# Patient Record
Sex: MALE | Race: OTHER | HISPANIC OR LATINO | URBAN - METROPOLITAN AREA
[De-identification: names, ages, dates, MRNs, and addresses within clinical notes are randomized per-mention and may not be internally consistent; named-entity substitution may affect disease eponyms.]

---

## 2019-02-09 ENCOUNTER — INPATIENT (INPATIENT)
Facility: HOSPITAL | Age: 39
LOS: 2 days | Discharge: HOME | DRG: 854 | End: 2019-02-12
Attending: INTERNAL MEDICINE | Admitting: INTERNAL MEDICINE
Payer: COMMERCIAL

## 2019-02-09 VITALS
SYSTOLIC BLOOD PRESSURE: 145 MMHG | TEMPERATURE: 101 F | WEIGHT: 214.95 LBS | RESPIRATION RATE: 20 BRPM | HEIGHT: 72 IN | OXYGEN SATURATION: 99 % | HEART RATE: 123 BPM | DIASTOLIC BLOOD PRESSURE: 91 MMHG

## 2019-02-09 DIAGNOSIS — J36 PERITONSILLAR ABSCESS: ICD-10-CM

## 2019-02-09 LAB
ABO RH CONFIRMATION: SIGNIFICANT CHANGE UP
ALBUMIN SERPL ELPH-MCNC: 3.8 G/DL — SIGNIFICANT CHANGE UP (ref 3.3–5.2)
ALP SERPL-CCNC: 98 U/L — SIGNIFICANT CHANGE UP (ref 40–120)
ALT FLD-CCNC: 37 U/L — SIGNIFICANT CHANGE UP
ANION GAP SERPL CALC-SCNC: 17 MMOL/L — SIGNIFICANT CHANGE UP (ref 5–17)
ANISOCYTOSIS BLD QL: SLIGHT — SIGNIFICANT CHANGE UP
APPEARANCE UR: CLEAR — SIGNIFICANT CHANGE UP
AST SERPL-CCNC: 15 U/L — SIGNIFICANT CHANGE UP
BACTERIA # UR AUTO: NEGATIVE — SIGNIFICANT CHANGE UP
BILIRUB SERPL-MCNC: 1.1 MG/DL — SIGNIFICANT CHANGE UP (ref 0.4–2)
BILIRUB UR-MCNC: ABNORMAL
BLD GP AB SCN SERPL QL: SIGNIFICANT CHANGE UP
BUN SERPL-MCNC: 15 MG/DL — SIGNIFICANT CHANGE UP (ref 8–20)
CALCIUM SERPL-MCNC: 9.5 MG/DL — SIGNIFICANT CHANGE UP (ref 8.6–10.2)
CHLORIDE SERPL-SCNC: 98 MMOL/L — SIGNIFICANT CHANGE UP (ref 98–107)
CO2 SERPL-SCNC: 23 MMOL/L — SIGNIFICANT CHANGE UP (ref 22–29)
COLOR SPEC: YELLOW — SIGNIFICANT CHANGE UP
CREAT SERPL-MCNC: 0.89 MG/DL — SIGNIFICANT CHANGE UP (ref 0.5–1.3)
CRP SERPL-MCNC: 22.6 MG/DL — HIGH (ref 0–0.4)
DIFF PNL FLD: ABNORMAL
EPI CELLS # UR: NEGATIVE — SIGNIFICANT CHANGE UP
GLUCOSE BLDC GLUCOMTR-MCNC: 173 MG/DL — HIGH (ref 70–99)
GLUCOSE SERPL-MCNC: 116 MG/DL — HIGH (ref 70–115)
GLUCOSE UR QL: NEGATIVE MG/DL — SIGNIFICANT CHANGE UP
HCT VFR BLD CALC: 42.5 % — SIGNIFICANT CHANGE UP (ref 42–52)
HGB BLD-MCNC: 14.7 G/DL — SIGNIFICANT CHANGE UP (ref 14–18)
HYPOCHROMIA BLD QL: SLIGHT — SIGNIFICANT CHANGE UP
KETONES UR-MCNC: ABNORMAL
LACTATE BLDV-MCNC: 0.8 MMOL/L — SIGNIFICANT CHANGE UP (ref 0.5–2)
LEUKOCYTE ESTERASE UR-ACNC: ABNORMAL
LYMPHOCYTES # BLD AUTO: 8 % — LOW (ref 20–55)
MACROCYTES BLD QL: SLIGHT — SIGNIFICANT CHANGE UP
MCHC RBC-ENTMCNC: 28.3 PG — SIGNIFICANT CHANGE UP (ref 27–31)
MCHC RBC-ENTMCNC: 34.6 G/DL — SIGNIFICANT CHANGE UP (ref 32–36)
MCV RBC AUTO: 81.7 FL — SIGNIFICANT CHANGE UP (ref 80–94)
MICROCYTES BLD QL: SLIGHT — SIGNIFICANT CHANGE UP
MONOCYTES NFR BLD AUTO: 4 % — SIGNIFICANT CHANGE UP (ref 3–10)
NEUTROPHILS NFR BLD AUTO: 88 % — HIGH (ref 37–73)
NITRITE UR-MCNC: POSITIVE
PH UR: 6 — SIGNIFICANT CHANGE UP (ref 5–8)
PLAT MORPH BLD: NORMAL — SIGNIFICANT CHANGE UP
PLATELET # BLD AUTO: 247 K/UL — SIGNIFICANT CHANGE UP (ref 150–400)
POIKILOCYTOSIS BLD QL AUTO: SLIGHT — SIGNIFICANT CHANGE UP
POTASSIUM SERPL-MCNC: 4.3 MMOL/L — SIGNIFICANT CHANGE UP (ref 3.5–5.3)
POTASSIUM SERPL-SCNC: 4.3 MMOL/L — SIGNIFICANT CHANGE UP (ref 3.5–5.3)
PROT SERPL-MCNC: 8.4 G/DL — SIGNIFICANT CHANGE UP (ref 6.6–8.7)
PROT UR-MCNC: 100 MG/DL
RBC # BLD: 5.2 M/UL — SIGNIFICANT CHANGE UP (ref 4.6–6.2)
RBC # FLD: 12.6 % — SIGNIFICANT CHANGE UP (ref 11–15.6)
RBC BLD AUTO: ABNORMAL
RBC CASTS # UR COMP ASSIST: SIGNIFICANT CHANGE UP /HPF (ref 0–4)
SODIUM SERPL-SCNC: 138 MMOL/L — SIGNIFICANT CHANGE UP (ref 135–145)
SP GR SPEC: 1.02 — SIGNIFICANT CHANGE UP (ref 1.01–1.02)
TYPE + AB SCN PNL BLD: SIGNIFICANT CHANGE UP
UROBILINOGEN FLD QL: NEGATIVE MG/DL — SIGNIFICANT CHANGE UP
WBC # BLD: 22.8 K/UL — HIGH (ref 4.8–10.8)
WBC # FLD AUTO: 22.8 K/UL — HIGH (ref 4.8–10.8)
WBC UR QL: SIGNIFICANT CHANGE UP

## 2019-02-09 PROCEDURE — 99284 EMERGENCY DEPT VISIT MOD MDM: CPT

## 2019-02-09 PROCEDURE — 99223 1ST HOSP IP/OBS HIGH 75: CPT

## 2019-02-09 PROCEDURE — 70491 CT SOFT TISSUE NECK W/DYE: CPT | Mod: 26

## 2019-02-09 RX ORDER — SODIUM CHLORIDE 9 MG/ML
1000 INJECTION INTRAMUSCULAR; INTRAVENOUS; SUBCUTANEOUS
Qty: 0 | Refills: 0 | Status: DISCONTINUED | OUTPATIENT
Start: 2019-02-09 | End: 2019-02-09

## 2019-02-09 RX ORDER — SODIUM CHLORIDE 9 MG/ML
1000 INJECTION INTRAMUSCULAR; INTRAVENOUS; SUBCUTANEOUS
Qty: 0 | Refills: 0 | Status: DISCONTINUED | OUTPATIENT
Start: 2019-02-09 | End: 2019-02-10

## 2019-02-09 RX ORDER — ONDANSETRON 8 MG/1
4 TABLET, FILM COATED ORAL EVERY 6 HOURS
Qty: 0 | Refills: 0 | Status: DISCONTINUED | OUTPATIENT
Start: 2019-02-09 | End: 2019-02-12

## 2019-02-09 RX ORDER — DEXAMETHASONE 0.5 MG/5ML
14 ELIXIR ORAL ONCE
Qty: 0 | Refills: 0 | Status: COMPLETED | OUTPATIENT
Start: 2019-02-10 | End: 2019-02-10

## 2019-02-09 RX ORDER — KETOROLAC TROMETHAMINE 30 MG/ML
30 SYRINGE (ML) INJECTION ONCE
Qty: 0 | Refills: 0 | Status: DISCONTINUED | OUTPATIENT
Start: 2019-02-09 | End: 2019-02-09

## 2019-02-09 RX ORDER — KETOROLAC TROMETHAMINE 30 MG/ML
15 SYRINGE (ML) INJECTION EVERY 6 HOURS
Qty: 0 | Refills: 0 | Status: DISCONTINUED | OUTPATIENT
Start: 2019-02-09 | End: 2019-02-12

## 2019-02-09 RX ORDER — DEXAMETHASONE 0.5 MG/5ML
10 ELIXIR ORAL ONCE
Qty: 0 | Refills: 0 | Status: COMPLETED | OUTPATIENT
Start: 2019-02-09 | End: 2019-02-09

## 2019-02-09 RX ORDER — ONDANSETRON 8 MG/1
4 TABLET, FILM COATED ORAL ONCE
Qty: 0 | Refills: 0 | Status: COMPLETED | OUTPATIENT
Start: 2019-02-09 | End: 2019-02-09

## 2019-02-09 RX ORDER — PANTOPRAZOLE SODIUM 20 MG/1
40 TABLET, DELAYED RELEASE ORAL
Qty: 0 | Refills: 0 | Status: DISCONTINUED | OUTPATIENT
Start: 2019-02-09 | End: 2019-02-12

## 2019-02-09 RX ORDER — ACETAMINOPHEN 500 MG
650 TABLET ORAL EVERY 6 HOURS
Qty: 0 | Refills: 0 | Status: DISCONTINUED | OUTPATIENT
Start: 2019-02-09 | End: 2019-02-12

## 2019-02-09 RX ORDER — SODIUM CHLORIDE 9 MG/ML
2000 INJECTION INTRAMUSCULAR; INTRAVENOUS; SUBCUTANEOUS ONCE
Qty: 0 | Refills: 0 | Status: COMPLETED | OUTPATIENT
Start: 2019-02-09 | End: 2019-02-09

## 2019-02-09 RX ORDER — DEXAMETHASONE 0.5 MG/5ML
10 ELIXIR ORAL ONCE
Qty: 0 | Refills: 0 | Status: COMPLETED | OUTPATIENT
Start: 2019-02-10 | End: 2019-02-10

## 2019-02-09 RX ORDER — IPRATROPIUM/ALBUTEROL SULFATE 18-103MCG
3 AEROSOL WITH ADAPTER (GRAM) INHALATION EVERY 6 HOURS
Qty: 0 | Refills: 0 | Status: DISCONTINUED | OUTPATIENT
Start: 2019-02-09 | End: 2019-02-12

## 2019-02-09 RX ORDER — FAMOTIDINE 10 MG/ML
20 INJECTION INTRAVENOUS ONCE
Qty: 0 | Refills: 0 | Status: COMPLETED | OUTPATIENT
Start: 2019-02-09 | End: 2019-02-09

## 2019-02-09 RX ORDER — DEXAMETHASONE 0.5 MG/5ML
20 ELIXIR ORAL ONCE
Qty: 0 | Refills: 0 | Status: COMPLETED | OUTPATIENT
Start: 2019-02-09 | End: 2019-02-09

## 2019-02-09 RX ADMIN — Medication 10 MILLIGRAM(S): at 12:56

## 2019-02-09 RX ADMIN — SODIUM CHLORIDE 125 MILLILITER(S): 9 INJECTION INTRAMUSCULAR; INTRAVENOUS; SUBCUTANEOUS at 18:41

## 2019-02-09 RX ADMIN — Medication 102 MILLIGRAM(S): at 12:29

## 2019-02-09 RX ADMIN — FAMOTIDINE 20 MILLIGRAM(S): 10 INJECTION INTRAVENOUS at 12:29

## 2019-02-09 RX ADMIN — SODIUM CHLORIDE 2000 MILLILITER(S): 9 INJECTION INTRAMUSCULAR; INTRAVENOUS; SUBCUTANEOUS at 12:28

## 2019-02-09 RX ADMIN — Medication 15 MILLIGRAM(S): at 23:13

## 2019-02-09 RX ADMIN — Medication 30 MILLIGRAM(S): at 12:30

## 2019-02-09 RX ADMIN — Medication 30 MILLIGRAM(S): at 12:56

## 2019-02-09 RX ADMIN — SODIUM CHLORIDE 2000 MILLILITER(S): 9 INJECTION INTRAMUSCULAR; INTRAVENOUS; SUBCUTANEOUS at 13:28

## 2019-02-09 RX ADMIN — Medication 110 MILLIGRAM(S): at 23:11

## 2019-02-09 RX ADMIN — Medication 100 MILLIGRAM(S): at 23:12

## 2019-02-09 RX ADMIN — Medication 600 MILLIGRAM(S): at 12:56

## 2019-02-09 RX ADMIN — ONDANSETRON 4 MILLIGRAM(S): 8 TABLET, FILM COATED ORAL at 12:30

## 2019-02-09 RX ADMIN — Medication 100 MILLIGRAM(S): at 12:30

## 2019-02-09 NOTE — ED ADULT NURSE NOTE - OBJECTIVE STATEMENT
assumed pt care at 1300.  Cardiac monitor showing sinus tachycardia and normal blood pressure and oxygen saturation on room air.  Pt taking cleocin X 5 days for positive strep infection.  Throat swelling, pain and fever not resolving.  Sent from urgent care.  Pt is alert and oriented X 3. Able to swallow water and saliva but not food.  IV fluids infusing well through 2 patent IV catheters in each arm. Last PO intake 0830 this morning water only. Awaiting ENT consult.   Significant other at bedside.

## 2019-02-09 NOTE — H&P ADULT - NSHPPHYSICALEXAM_GEN_ALL_CORE
ICU Vital Signs Last 24 Hrs  T(C): 37 (09 Feb 2019 13:01), Max: 39 (09 Feb 2019 12:06)  T(F): 98.6 (09 Feb 2019 13:01), Max: 102.2 (09 Feb 2019 12:06)  HR: 89 (09 Feb 2019 15:13) (89 - 123)  BP: 151/97 (09 Feb 2019 13:01) (145/91 - 151/97)  BP(mean): --  ABP: --  ABP(mean): --  RR: 18 (09 Feb 2019 15:13) (18 - 20)  SpO2: 97% (09 Feb 2019 15:13) (97% - 99%)

## 2019-02-09 NOTE — PROGRESS NOTE ADULT - SUBJECTIVE AND OBJECTIVE BOX
consult dictated  Left peritonsil cellitis  1 cc pus, cavity opened with dishwater type fluid  Cleocin IV  decadron taper  Advance diet  JBonafede

## 2019-02-09 NOTE — H&P ADULT - HISTORY OF PRESENT ILLNESS
This is 39yo M with PMh of GERD presenting with throat swelling and pain since Monday, he developed sore throat, went to Urgent Care and was told he has Strep, was prescribed cleocin which he has been taking.  Sx did not improve and went back to Urgent Care today, found to have an abscess and referred to ED.  Temp 102 in ED.  Has had trouble swallowing over last few days. Report severe pain during swallowing, unable to tolerate PO. Pt was seen by ENT, s/p 1 cc pus removed    Denied chest pain, SOB, palpitation, abd. pain, nausea, vomiting, headache, dizziness, numbness, tingling, urinary and bowel complaints.

## 2019-02-09 NOTE — ED PROVIDER NOTE - OBJECTIVE STATEMENT
39 yo male  pmh asthma , gerd  comes to ed with 5 day history ot b/ throat pain; pt went to urgent care and started on antibiotics without relief of symptoms; pt noted decreased food and liquid intake

## 2019-02-09 NOTE — CONSULT NOTE ADULT - ASSESSMENT
Peritonsillar Abscess  - started with strep throat  - PCN allergy - parents told him he had severe reaction to PCN as child and they had to take him to ED.  - IV Clinda  - IV decadron taper per ENT  - s/p I&D by ENT  - Pain control  - f/u cultures

## 2019-02-09 NOTE — ED ADULT NURSE NOTE - NSIMPLEMENTINTERV_GEN_ALL_ED
Implemented All Universal Safety Interventions:  [place] to call system. Call bell, personal items and telephone within reach. Instruct patient to call for assistance. Room bathroom lighting operational. Non-slip footwear when patient is off stretcher. Physically safe environment: no spills, clutter or unnecessary equipment. Stretcher in lowest position, wheels locked, appropriate side rails in place.

## 2019-02-09 NOTE — ED ADULT NURSE REASSESSMENT NOTE - NS ED NURSE REASSESS COMMENT FT1
As per Dr. Ruby, pt given 3 bottles of water.  Tolerating well.
Dr. Ruby (ENT) at bedside to evaluate.
Pt given jello, applesauce and yogurt with gingerale.  Will advance as tolerated as per MD order
Received pt from Jerica DAMON. PT resting comfortably in stretcher at this time. NAD noted at this time. Pt awaiting bed to floor
Report given to AMALIA Jimenez
pt eating, tolerating PO, denies diff breathing, no drooling noted, 98% RA, resp even unlabored, pt states he feels " so much better"
Report received from offgoing RN, charting as noted. Patient is A&Ox4, denies any pain or discomfort. Pt resting comfortably in bed with significant other at bedside, just finished meal.  Airway is patent, denies chest pain/respiratory distress. Updated pt on plan of care, awaiting bed.

## 2019-02-09 NOTE — CONSULT NOTE ADULT - SUBJECTIVE AND OBJECTIVE BOX
PCP: Dr. John - but looking for a new PCP    Information Obtained from:  ED provider, EHR, Patient     CC : Patient is a 38y old  Male who presents with a chief complaint of throat pain and swelling.    HPI:  39yo M presenting with throat swelling and pain.  5 days ago, he developed sore throat, went to Urgent Care and was told he has Strep, was prescribed cleocin which he has been taking.  Sx did not improve and went back to Urgent Care today, found to have an abscess and referred to ED.  Temp 102 in ED.  Has had trouble swallowing over last few days, but was able to tolerate solid food before my evaluation today.  No   sweats, dizziness, HA, changes in vision, cp, palpitations, sob, persistent cough, n/v/d, abd pain, dysuria, focal weakness, or calf pain.      REVIEW OF SYSTEMS:  See HPI, all others negative    PAST MEDICAL & SURGICAL HISTORY:  GERD (gastroesophageal reflux disease)  Asthma  No significant past surgical history    SOCIAL HISTORY:  Smoking Hx:  quit regular smoking 3ya, now smokes a cigarette every 2 months or so  ETOH Hx: 2-3 drinks/3-4 days a week  Illicit Drug Use: smokes marijuana daily    Allergies    penicillin (Unknown)    Intolerances    Home Medications:  omeprazole 40 mg oral delayed release capsule: 1 cap(s) orally once a day (2019 13:10)    FAMILY HISTORY: No relevant family hx      PHYSICAL EXAM:  Vital Signs Last 24 Hrs  T(C): 37 (2019 13:01), Max: 39 (2019 12:06)  T(F): 98.6 (2019 13:01), Max: 102.2 (2019 12:06)  HR: 89 (2019 15:13) (89 - 123)  BP: 151/97 (2019 13:01) (145/91 - 151/97)  BP(mean): --  RR: 18 (2019 15:13) (18 - 20)  SpO2: 97% (2019 15:13) (97% - 99%)    Virtual visit precludes detailed physical exam.  GENERAL: NAD, well-groomed, well-developed, awake, alert, oriented x 3, fluent and coherent speech, able to speak in full sentences     LABS:                        14.7   22.8  )-----------( 247      ( 2019 12:27 )             42.5         138  |  98  |  15.0  ----------------------------<  116<H>  4.3   |  23.0  |  0.89    Ca    9.5      2019 12:27    TPro  8.4  /  Alb  3.8  /  TBili  1.1  /  DBili  x   /  AST  15  /  ALT  37  /  AlkPhos  98        Urinalysis Basic - ( 2019 13:25 )    Color: Yellow / Appearance: Clear / S.025 / pH: x  Gluc: x / Ketone: Large  / Bili: Small / Urobili: Negative mg/dL   Blood: x / Protein: 100 mg/dL / Nitrite: Positive   Leuk Esterase: Trace / RBC: 0-2 /HPF / WBC 3-5   Sq Epi: x / Non Sq Epi: Negative / Bacteria: Negative    < from: CT Neck Soft Tissue w/ IV Cont (19 @ 14:34) >  Impression: Large left tonsillar/peritonsillar abscess with associated   left pharyngitis and left cervical lymphadenopathy.    < end of copied text >

## 2019-02-09 NOTE — ED STATDOCS - PROGRESS NOTE DETAILS
37 y/o M pt with no PMHx significant presents to the ED c/o worsening sore throat onset Monday (6 days ago). Reports difficulty speaking, fever, decreased PO intake, and difficulty swallowing. Pt states he went to the Urgent Care on Tuesday where they diagnosed him with strep and placed him on Clindamycin. He was informed to follow up with and ENT and if his symptoms worsened to come to the ED. He is allergic to Penicillin. No further complaints at this time. Patient will be moved to the main ED for further evaluation by another provider.   PE: CHICHO tonsillar swelling, L worse than the R, shifting his uvula 39 y/o M pt with no PMHx significant presents to the ED c/o worsening sore throat onset Monday (6 days ago). Reports difficulty speaking, fever, decreased PO intake, and difficulty swallowing. Pt states he went to the Urgent Care on Tuesday where they diagnosed him with strep and placed him on Clindamycin. He was informed to follow up with and ENT and if his symptoms worsened to come to the ED. He is allergic to Penicillin. No further complaints at this time. Patient will be moved to the main ED for further evaluation by another provider.   PE: CHICHO tonsillar swelling, L worse than the R, L touching his uvula, shifting his uvula to the R, with poor mouth opening, and voice is muffled Spoke with Dr. Pastrana and transferred care over to him.

## 2019-02-10 LAB
ANION GAP SERPL CALC-SCNC: 11 MMOL/L — SIGNIFICANT CHANGE UP (ref 5–17)
BUN SERPL-MCNC: 16 MG/DL — SIGNIFICANT CHANGE UP (ref 8–20)
CALCIUM SERPL-MCNC: 8.5 MG/DL — LOW (ref 8.6–10.2)
CHLORIDE SERPL-SCNC: 108 MMOL/L — HIGH (ref 98–107)
CO2 SERPL-SCNC: 21 MMOL/L — LOW (ref 22–29)
CREAT SERPL-MCNC: 0.74 MG/DL — SIGNIFICANT CHANGE UP (ref 0.5–1.3)
CULTURE RESULTS: NO GROWTH — SIGNIFICANT CHANGE UP
GLUCOSE SERPL-MCNC: 216 MG/DL — HIGH (ref 70–115)
HCT VFR BLD CALC: 36.6 % — LOW (ref 42–52)
HGB BLD-MCNC: 12.4 G/DL — LOW (ref 14–18)
MCHC RBC-ENTMCNC: 27.9 PG — SIGNIFICANT CHANGE UP (ref 27–31)
MCHC RBC-ENTMCNC: 33.9 G/DL — SIGNIFICANT CHANGE UP (ref 32–36)
MCV RBC AUTO: 82.2 FL — SIGNIFICANT CHANGE UP (ref 80–94)
PLATELET # BLD AUTO: 222 K/UL — SIGNIFICANT CHANGE UP (ref 150–400)
POTASSIUM SERPL-MCNC: 4.6 MMOL/L — SIGNIFICANT CHANGE UP (ref 3.5–5.3)
POTASSIUM SERPL-SCNC: 4.6 MMOL/L — SIGNIFICANT CHANGE UP (ref 3.5–5.3)
RBC # BLD: 4.45 M/UL — LOW (ref 4.6–6.2)
RBC # FLD: 12.7 % — SIGNIFICANT CHANGE UP (ref 11–15.6)
SODIUM SERPL-SCNC: 140 MMOL/L — SIGNIFICANT CHANGE UP (ref 135–145)
SPECIMEN SOURCE: SIGNIFICANT CHANGE UP
WBC # BLD: 17.7 K/UL — HIGH (ref 4.8–10.8)
WBC # FLD AUTO: 17.7 K/UL — HIGH (ref 4.8–10.8)

## 2019-02-10 PROCEDURE — 99233 SBSQ HOSP IP/OBS HIGH 50: CPT

## 2019-02-10 RX ORDER — SACCHAROMYCES BOULARDII 250 MG
250 POWDER IN PACKET (EA) ORAL
Qty: 0 | Refills: 0 | Status: DISCONTINUED | OUTPATIENT
Start: 2019-02-10 | End: 2019-02-12

## 2019-02-10 RX ORDER — SODIUM CHLORIDE 9 MG/ML
1000 INJECTION INTRAMUSCULAR; INTRAVENOUS; SUBCUTANEOUS
Qty: 0 | Refills: 0 | Status: DISCONTINUED | OUTPATIENT
Start: 2019-02-10 | End: 2019-02-11

## 2019-02-10 RX ADMIN — Medication 250 MILLIGRAM(S): at 18:07

## 2019-02-10 RX ADMIN — Medication 100 MILLIGRAM(S): at 22:02

## 2019-02-10 RX ADMIN — SODIUM CHLORIDE 125 MILLILITER(S): 9 INJECTION INTRAMUSCULAR; INTRAVENOUS; SUBCUTANEOUS at 08:27

## 2019-02-10 RX ADMIN — SODIUM CHLORIDE 100 MILLILITER(S): 9 INJECTION INTRAMUSCULAR; INTRAVENOUS; SUBCUTANEOUS at 22:28

## 2019-02-10 RX ADMIN — Medication 107 MILLIGRAM(S): at 10:31

## 2019-02-10 RX ADMIN — Medication 15 MILLIGRAM(S): at 23:01

## 2019-02-10 RX ADMIN — Medication 102 MILLIGRAM(S): at 22:28

## 2019-02-10 RX ADMIN — PANTOPRAZOLE SODIUM 40 MILLIGRAM(S): 20 TABLET, DELAYED RELEASE ORAL at 06:06

## 2019-02-10 RX ADMIN — Medication 100 MILLIGRAM(S): at 06:06

## 2019-02-10 RX ADMIN — Medication 15 MILLIGRAM(S): at 14:01

## 2019-02-10 RX ADMIN — SODIUM CHLORIDE 100 MILLILITER(S): 9 INJECTION INTRAMUSCULAR; INTRAVENOUS; SUBCUTANEOUS at 14:10

## 2019-02-10 RX ADMIN — Medication 15 MILLIGRAM(S): at 22:01

## 2019-02-10 RX ADMIN — Medication 100 MILLIGRAM(S): at 14:10

## 2019-02-10 RX ADMIN — Medication 15 MILLIGRAM(S): at 10:31

## 2019-02-10 NOTE — PROGRESS NOTE ADULT - SUBJECTIVE AND OBJECTIVE BOX
much better  decreased peritonsillar edema  would continue IV abx, IVF  If continued improvement- should be able to d/c in AM on oral abx (clinda) and medrol dose pack  f/u as op 7-10 days    FERNANDA Ruby

## 2019-02-10 NOTE — PROGRESS NOTE ADULT - ASSESSMENT
This is 39yo M with PMh of GERD presenting with throat swelling and pain since Monday, he developed sore throat, went to Urgent Care and was told he has Strep, was prescribed cleocin which he has been taking.  Sx did not improve and went back to Urgent Care today, found to have an abscess and referred to ED.  Temp 102 in ED.  Has had trouble swallowing over last few days. Report severe pain during swallowing, unable to tolerate PO. Pt was seen by ENT, s/p 1 cc pus removed    A/P    >Sepsis due to L tonsillar abscess  appreciate ENT - s/p 1 cc pus removed  c/w Cleocin, IVF plus florastor  c/w decadron taper per ENT   trend WBC and fever  f/u wound and blood c/s    >GERd - PPI    >Asthma -stable   nebs prn    >DVt PPX - SCD, encourage ambulation

## 2019-02-10 NOTE — PROGRESS NOTE ADULT - SUBJECTIVE AND OBJECTIVE BOX
Internal Medicine Hospitalist - Dr. Gregoria RANDALL    [id number]    38y      Male    Patient is a 38y old  Male who presents with a chief complaint of Tonsillar abscess (10 Feb 2019 11:19)    INTERVAL HPI/ OVERNIGHT EVENTS: Patient is seen and examined, feeling better, pain and swelling improving, afebrile today     REVIEW OF SYSTEMS:    Denied fever, chills, abd. pain, nausea, vomiting, chest pain, SOB, headache, dizziness    PHYSICAL EXAM:    Vital Signs Last 24 Hrs  T(C): 36.7 (10 Feb 2019 11:30), Max: 36.9 (10 Feb 2019 07:52)  T(F): 98 (10 Feb 2019 11:30), Max: 98.4 (10 Feb 2019 07:52)  HR: 79 (10 Feb 2019 11:30) (79 - 105)  BP: 140/93 (10 Feb 2019 11:30) (131/94 - 144/95)  BP(mean): --  RR: 18 (10 Feb 2019 11:30) (17 - 18)  SpO2: 97% (10 Feb 2019 11:30) (97% - 98%)    GENERAL: NAD  HEENT: improving swelling  Neck- sensitive and swelling improving  CHEST/LUNG: CTA b/l   HEART: S1S2+ audible  ABDOMEN: Soft, Nontender, Nondistended; Bowel sounds present  CNS: AAO X 3    LABS:                        12.4   17.7  )-----------( 222      ( 10 Feb 2019 08:00 )             36.6     02-10    140  |  108<H>  |  16.0  ----------------------------<  216<H>  4.6   |  21.0<L>  |  0.74    Ca    8.5<L>      10 Feb 2019 08:00    TPro  8.4  /  Alb  3.8  /  TBili  1.1  /  DBili  x   /  AST  15  /  ALT  37  /  AlkPhos  98  02-09      Urinalysis Basic - ( 2019 13:25 )    Color: Yellow / Appearance: Clear / S.025 / pH: x  Gluc: x / Ketone: Large  / Bili: Small / Urobili: Negative mg/dL   Blood: x / Protein: 100 mg/dL / Nitrite: Positive   Leuk Esterase: Trace / RBC: 0-2 /HPF / WBC 3-5   Sq Epi: x / Non Sq Epi: Negative / Bacteria: Negative          MEDICATIONS  (STANDING):  clindamycin IVPB 600 milliGRAM(s) IV Intermittent every 8 hours  dexamethasone  IVPB 10 milliGRAM(s) IV Intermittent Once  pantoprazole    Tablet 40 milliGRAM(s) Oral before breakfast  saccharomyces boulardii 250 milliGRAM(s) Oral two times a day  sodium chloride 0.9%. 1000 milliLiter(s) (100 mL/Hr) IV Continuous <Continuous>    MEDICATIONS  (PRN):  acetaminophen   Tablet .. 650 milliGRAM(s) Oral every 6 hours PRN Temp greater or equal to 38C (100.4F), Mild Pain (1 - 3)  ALBUTerol/ipratropium for Nebulization 3 milliLiter(s) Nebulizer every 6 hours PRN Bronchospasm  ketorolac   Injectable 15 milliGRAM(s) IV Push every 6 hours PRN pain  ondansetron Injectable 4 milliGRAM(s) IV Push every 6 hours PRN Nausea and/or Vomiting      RADIOLOGY & ADDITIONAL TEST Internal Medicine Hospitalist - Dr. Gregoria RANDALL    [id number]    38y      Male    Patient is a 38y old  Male who presents with a chief complaint of Tonsillar abscess (10 Feb 2019 11:19)    INTERVAL HPI/ OVERNIGHT EVENTS: Patient is seen and examined, feeling better, pain and swelling improving, afebrile today     REVIEW OF SYSTEMS:    Denied fever, chills, abd. pain, nausea, vomiting, chest pain, SOB, headache, dizziness    PHYSICAL EXAM:    Vital Signs Last 24 Hrs  T(C): 36.7 (10 Feb 2019 11:30), Max: 36.9 (10 Feb 2019 07:52)  T(F): 98 (10 Feb 2019 11:30), Max: 98.4 (10 Feb 2019 07:52)  HR: 79 (10 Feb 2019 11:30) (79 - 105)  BP: 140/93 (10 Feb 2019 11:30) (131/94 - 144/95)  BP(mean): --  RR: 18 (10 Feb 2019 11:30) (17 - 18)  SpO2: 97% (10 Feb 2019 11:30) (97% - 98%)    GENERAL: NAD  HEENT: improving swelling  Neck- sensitive and swelling improving  CHEST/LUNG: CTA b/l   HEART: S1S2+ audible  ABDOMEN: Soft, Nontender, Nondistended; Bowel sounds present  CNS: AAO X 3  ext - no edema    LABS:                        12.4   17.7  )-----------( 222      ( 10 Feb 2019 08:00 )             36.6     02-10    140  |  108<H>  |  16.0  ----------------------------<  216<H>  4.6   |  21.0<L>  |  0.74    Ca    8.5<L>      10 Feb 2019 08:00    TPro  8.4  /  Alb  3.8  /  TBili  1.1  /  DBili  x   /  AST  15  /  ALT  37  /  AlkPhos  98  02-09      Urinalysis Basic - ( 2019 13:25 )    Color: Yellow / Appearance: Clear / S.025 / pH: x  Gluc: x / Ketone: Large  / Bili: Small / Urobili: Negative mg/dL   Blood: x / Protein: 100 mg/dL / Nitrite: Positive   Leuk Esterase: Trace / RBC: 0-2 /HPF / WBC 3-5   Sq Epi: x / Non Sq Epi: Negative / Bacteria: Negative          MEDICATIONS  (STANDING):  clindamycin IVPB 600 milliGRAM(s) IV Intermittent every 8 hours  dexamethasone  IVPB 10 milliGRAM(s) IV Intermittent Once  pantoprazole    Tablet 40 milliGRAM(s) Oral before breakfast  saccharomyces boulardii 250 milliGRAM(s) Oral two times a day  sodium chloride 0.9%. 1000 milliLiter(s) (100 mL/Hr) IV Continuous <Continuous>    MEDICATIONS  (PRN):  acetaminophen   Tablet .. 650 milliGRAM(s) Oral every 6 hours PRN Temp greater or equal to 38C (100.4F), Mild Pain (1 - 3)  ALBUTerol/ipratropium for Nebulization 3 milliLiter(s) Nebulizer every 6 hours PRN Bronchospasm  ketorolac   Injectable 15 milliGRAM(s) IV Push every 6 hours PRN pain  ondansetron Injectable 4 milliGRAM(s) IV Push every 6 hours PRN Nausea and/or Vomiting      RADIOLOGY & ADDITIONAL TEST

## 2019-02-11 LAB
ANION GAP SERPL CALC-SCNC: 14 MMOL/L — SIGNIFICANT CHANGE UP (ref 5–17)
BUN SERPL-MCNC: 14 MG/DL — SIGNIFICANT CHANGE UP (ref 8–20)
CALCIUM SERPL-MCNC: 8.4 MG/DL — LOW (ref 8.6–10.2)
CHLORIDE SERPL-SCNC: 104 MMOL/L — SIGNIFICANT CHANGE UP (ref 98–107)
CO2 SERPL-SCNC: 25 MMOL/L — SIGNIFICANT CHANGE UP (ref 22–29)
CREAT SERPL-MCNC: 0.69 MG/DL — SIGNIFICANT CHANGE UP (ref 0.5–1.3)
GLUCOSE SERPL-MCNC: 126 MG/DL — HIGH (ref 70–115)
HCT VFR BLD CALC: 40 % — LOW (ref 42–52)
HGB BLD-MCNC: 13.1 G/DL — LOW (ref 14–18)
MCHC RBC-ENTMCNC: 27.1 PG — SIGNIFICANT CHANGE UP (ref 27–31)
MCHC RBC-ENTMCNC: 32.8 G/DL — SIGNIFICANT CHANGE UP (ref 32–36)
MCV RBC AUTO: 82.6 FL — SIGNIFICANT CHANGE UP (ref 80–94)
PLATELET # BLD AUTO: 286 K/UL — SIGNIFICANT CHANGE UP (ref 150–400)
POTASSIUM SERPL-MCNC: 4.3 MMOL/L — SIGNIFICANT CHANGE UP (ref 3.5–5.3)
POTASSIUM SERPL-SCNC: 4.3 MMOL/L — SIGNIFICANT CHANGE UP (ref 3.5–5.3)
RBC # BLD: 4.84 M/UL — SIGNIFICANT CHANGE UP (ref 4.6–6.2)
RBC # FLD: 12.9 % — SIGNIFICANT CHANGE UP (ref 11–15.6)
SODIUM SERPL-SCNC: 143 MMOL/L — SIGNIFICANT CHANGE UP (ref 135–145)
WBC # BLD: 24.3 K/UL — HIGH (ref 4.8–10.8)
WBC # FLD AUTO: 24.3 K/UL — HIGH (ref 4.8–10.8)

## 2019-02-11 PROCEDURE — 99232 SBSQ HOSP IP/OBS MODERATE 35: CPT

## 2019-02-11 RX ADMIN — Medication 100 MILLIGRAM(S): at 06:00

## 2019-02-11 RX ADMIN — Medication 250 MILLIGRAM(S): at 06:01

## 2019-02-11 RX ADMIN — SODIUM CHLORIDE 100 MILLILITER(S): 9 INJECTION INTRAMUSCULAR; INTRAVENOUS; SUBCUTANEOUS at 13:52

## 2019-02-11 RX ADMIN — Medication 100 MILLIGRAM(S): at 13:52

## 2019-02-11 RX ADMIN — Medication 250 MILLIGRAM(S): at 17:58

## 2019-02-11 RX ADMIN — PANTOPRAZOLE SODIUM 40 MILLIGRAM(S): 20 TABLET, DELAYED RELEASE ORAL at 06:01

## 2019-02-11 RX ADMIN — Medication 100 MILLIGRAM(S): at 22:40

## 2019-02-11 NOTE — PROGRESS NOTE ADULT - SUBJECTIVE AND OBJECTIVE BOX
Internal Medicine Hospitalist - Dr. Gregoria RANDALL    [id number]    38y      Male    Patient is a 38y old  Male who presents with a chief complaint of Tonsillar abscess (10 Feb 2019 13:29)    INTERVAL HPI/ OVERNIGHT EVENTS: Patient is seen and examined, feeling better, improving pain and swelling, tolerating Po    REVIEW OF SYSTEMS:    Denied fever, chills, abd. pain, nausea, vomiting, chest pain, SOB, headache, dizziness    PHYSICAL EXAM:    Vital Signs Last 24 Hrs  T(C): 36.9 (2019 08:22), Max: 36.9 (2019 08:22)  T(F): 98.4 (2019 08:22), Max: 98.4 (2019 08:22)  HR: 78 (2019 08:22) (78 - 97)  BP: 130/78 (2019 08:22) (130/78 - 154/80)  BP(mean): --  RR: 18 (2019 08:22) (18 - 18)  SpO2: 98% (2019 08:22) (97% - 99%)    GENERAL: NAD  Neck: swelling improved  CHEST/LUNG: CTA b/l   HEART: S1S2+ audible  ABDOMEN: Soft, Nontender, Nondistended; Bowel sounds present  EXTREMITIES:  no edema  CNS: AAO X 3  Psychiatry: normal mood    LABS:                        12.4   17.7  )-----------( 222      ( 10 Feb 2019 08:00 )             36.6     02-10    140  |  108<H>  |  16.0  ----------------------------<  216<H>  4.6   |  21.0<L>  |  0.74    Ca    8.5<L>      10 Feb 2019 08:00    TPro  8.4  /  Alb  3.8  /  TBili  1.1  /  DBili  x   /  AST  15  /  ALT  37  /  AlkPhos  98  02-09      Urinalysis Basic - ( 2019 13:25 )    Color: Yellow / Appearance: Clear / S.025 / pH: x  Gluc: x / Ketone: Large  / Bili: Small / Urobili: Negative mg/dL   Blood: x / Protein: 100 mg/dL / Nitrite: Positive   Leuk Esterase: Trace / RBC: 0-2 /HPF / WBC 3-5   Sq Epi: x / Non Sq Epi: Negative / Bacteria: Negative          MEDICATIONS  (STANDING):  clindamycin IVPB 600 milliGRAM(s) IV Intermittent every 8 hours  pantoprazole    Tablet 40 milliGRAM(s) Oral before breakfast  saccharomyces boulardii 250 milliGRAM(s) Oral two times a day  sodium chloride 0.9%. 1000 milliLiter(s) (100 mL/Hr) IV Continuous <Continuous>    MEDICATIONS  (PRN):  acetaminophen   Tablet .. 650 milliGRAM(s) Oral every 6 hours PRN Temp greater or equal to 38C (100.4F), Mild Pain (1 - 3)  ALBUTerol/ipratropium for Nebulization 3 milliLiter(s) Nebulizer every 6 hours PRN Bronchospasm  ketorolac   Injectable 15 milliGRAM(s) IV Push every 6 hours PRN pain  ondansetron Injectable 4 milliGRAM(s) IV Push every 6 hours PRN Nausea and/or Vomiting      RADIOLOGY & ADDITIONAL TEST

## 2019-02-11 NOTE — PROGRESS NOTE ADULT - ASSESSMENT
This is 39yo M with PMh of GERD presenting with throat swelling and pain since Monday, he developed sore throat, went to Urgent Care and was told he has Strep, was prescribed cleocin which he has been taking.  Sx did not improve and went back to Urgent Care today, found to have an abscess and referred to ED.  Temp 102 in ED.  Has had trouble swallowing over last few days. Report severe pain during swallowing, unable to tolerate PO. Pt was seen by ENT, s/p 1 cc pus removed. Pt is started on Clindamycin, improving pain and swelling     A/P    >Sepsis due to L tonsillar abscess  appreciate ENT - s/p 1 cc pus removed  c/w Cleocin, IVF plus florastor  s/p decadron taper per ENT   trend WBC and fever  f/u blood c/s pending    >GERd - PPI    >Asthma -stable   nebs prn    >DVt PPX - SCD, encourage ambulation

## 2019-02-12 VITALS
OXYGEN SATURATION: 99 % | SYSTOLIC BLOOD PRESSURE: 155 MMHG | HEART RATE: 95 BPM | TEMPERATURE: 98 F | DIASTOLIC BLOOD PRESSURE: 90 MMHG | RESPIRATION RATE: 18 BRPM

## 2019-02-12 LAB
HCT VFR BLD CALC: 40.3 % — LOW (ref 42–52)
HGB BLD-MCNC: 13.1 G/DL — LOW (ref 14–18)
MCHC RBC-ENTMCNC: 26.8 PG — LOW (ref 27–31)
MCHC RBC-ENTMCNC: 32.5 G/DL — SIGNIFICANT CHANGE UP (ref 32–36)
MCV RBC AUTO: 82.4 FL — SIGNIFICANT CHANGE UP (ref 80–94)
PLATELET # BLD AUTO: 239 K/UL — SIGNIFICANT CHANGE UP (ref 150–400)
RBC # BLD: 4.89 M/UL — SIGNIFICANT CHANGE UP (ref 4.6–6.2)
RBC # FLD: 13.1 % — SIGNIFICANT CHANGE UP (ref 11–15.6)
WBC # BLD: 13.5 K/UL — HIGH (ref 4.8–10.8)
WBC # FLD AUTO: 13.5 K/UL — HIGH (ref 4.8–10.8)

## 2019-02-12 PROCEDURE — 70491 CT SOFT TISSUE NECK W/DYE: CPT

## 2019-02-12 PROCEDURE — 83605 ASSAY OF LACTIC ACID: CPT

## 2019-02-12 PROCEDURE — 96368 THER/DIAG CONCURRENT INF: CPT

## 2019-02-12 PROCEDURE — 86850 RBC ANTIBODY SCREEN: CPT

## 2019-02-12 PROCEDURE — 96365 THER/PROPH/DIAG IV INF INIT: CPT | Mod: XU

## 2019-02-12 PROCEDURE — 96375 TX/PRO/DX INJ NEW DRUG ADDON: CPT | Mod: XU

## 2019-02-12 PROCEDURE — 86140 C-REACTIVE PROTEIN: CPT

## 2019-02-12 PROCEDURE — 86901 BLOOD TYPING SEROLOGIC RH(D): CPT

## 2019-02-12 PROCEDURE — 85027 COMPLETE CBC AUTOMATED: CPT

## 2019-02-12 PROCEDURE — 86900 BLOOD TYPING SEROLOGIC ABO: CPT

## 2019-02-12 PROCEDURE — 81001 URINALYSIS AUTO W/SCOPE: CPT

## 2019-02-12 PROCEDURE — 82962 GLUCOSE BLOOD TEST: CPT

## 2019-02-12 PROCEDURE — 87086 URINE CULTURE/COLONY COUNT: CPT

## 2019-02-12 PROCEDURE — 87040 BLOOD CULTURE FOR BACTERIA: CPT

## 2019-02-12 PROCEDURE — 99285 EMERGENCY DEPT VISIT HI MDM: CPT | Mod: 25

## 2019-02-12 PROCEDURE — 80053 COMPREHEN METABOLIC PANEL: CPT

## 2019-02-12 PROCEDURE — 80048 BASIC METABOLIC PNL TOTAL CA: CPT

## 2019-02-12 PROCEDURE — 36415 COLL VENOUS BLD VENIPUNCTURE: CPT

## 2019-02-12 PROCEDURE — 99239 HOSP IP/OBS DSCHRG MGMT >30: CPT

## 2019-02-12 RX ORDER — SACCHAROMYCES BOULARDII 250 MG
1 POWDER IN PACKET (EA) ORAL
Qty: 20 | Refills: 0 | OUTPATIENT
Start: 2019-02-12 | End: 2019-02-21

## 2019-02-12 RX ORDER — OMEPRAZOLE 10 MG/1
1 CAPSULE, DELAYED RELEASE ORAL
Qty: 0 | Refills: 0 | COMMUNITY

## 2019-02-12 RX ADMIN — Medication 250 MILLIGRAM(S): at 05:27

## 2019-02-12 RX ADMIN — PANTOPRAZOLE SODIUM 40 MILLIGRAM(S): 20 TABLET, DELAYED RELEASE ORAL at 05:27

## 2019-02-12 RX ADMIN — Medication 100 MILLIGRAM(S): at 05:28

## 2019-02-12 NOTE — DISCHARGE NOTE ADULT - HOSPITAL COURSE
This is 39yo M with PMh of GERD presenting with throat swelling and pain since Monday, he developed sore throat, went to Urgent Care and was told he has Strep, was prescribed cleocin 300mg TID which he has been taking.  Sx did not improve and went back to Urgent Care today, found to have an abscess and referred to ED.  Temp 102 in ED.  Has had trouble swallowing over last few days. Report severe pain during swallowing, unable to tolerate PO. Pt was seen by ENT, s/p 1 cc pus removed. Pt is started on Clindamycin 600mg TID, pen allergy, pain and swelling improved, tolerating feeding, afebrile, improving leucocytosis, ambulatin. Pt will be discharge home on Clindamycin 600mg TID for 5 more days and recommend to f/u ENT as an outpatient.  Discussed with mother present bedside    Pt is noted elevated BP, recommend low salt diet and monitor BP at home    A/P    >Sepsis due to L tonsillar abscess  sepsis present on admission resolved  appreciate ENT - s/p 1 cc pus removed - clear to discharge home on Clindamycin and Medrol dose pack  c/w Cleocin, plus florastor  f/u blood c/s negative     ICU Vital Signs Last 24 Hrs  T(C): 36.9 (12 Feb 2019 07:53), Max: 36.9 (12 Feb 2019 07:53)  T(F): 98.4 (12 Feb 2019 07:53), Max: 98.4 (12 Feb 2019 07:53)  HR: 76 (12 Feb 2019 07:53) (76 - 81)  BP: 152/64 (12 Feb 2019 07:53) (131/71 - 152/64)  RR: 20 (12 Feb 2019 07:53) (18 - 20)  SpO2: 98% (12 Feb 2019 07:53) (97% - 98%)    PHYSICAL EXAM:    GENERAL: NAD  NEck - non tedner, swelling resolved  HEENT- improving erythema,   NERVOUS SYSTEM:  Alert & Oriented X3, Good concentration; Motor Strength 5/5 B/L upper and lower extremities; DTRs 2+ intact and symmetric  CHEST/LUNG: Clear to percussion bilaterally; No rales, rhonchi, wheezing, or rubs  HEART: Regular rate and rhythm; No murmurs, rubs, or gallops  ABDOMEN: Soft, Nontender, Nondistended; Bowel sounds present  EXTREMITIES:  no edema    time spent 32 minutes This is 39yo M with PMh of GERD presenting with throat swelling and pain since Monday, he developed sore throat, went to Urgent Care and was told he has Strep, was prescribed cleocin 300mg TID which he has been taking.  Sx did not improve and went back to Urgent Care today, found to have an abscess and referred to ED.  Temp 102 in ED.  Has had trouble swallowing over last few days. Report severe pain during swallowing, unable to tolerate PO. Pt was seen by ENT, s/p 1 cc pus removed. Pt is started on Clindamycin 600mg TID, pen allergy, pain and swelling improved, tolerating feeding, afebrile, improving leucocytosis, ambulatin. Pt will be discharge home on Clindamycin 600mg TID for 5 more days and recommend to f/u ENT as an outpatient.  Discussed with mother present bedside.     Pt is noted elevated BP, recommend low salt diet and monitor BP at home    A/P    >Sepsis due to L tonsillar abscess  sepsis present on admission resolved  appreciate ENT - s/p 1 cc pus removed - clear to discharge home on Clindamycin and Medrol dose pack  c/w Cleocin, plus florastor  f/u blood c/s negative     ICU Vital Signs Last 24 Hrs  T(C): 36.9 (12 Feb 2019 07:53), Max: 36.9 (12 Feb 2019 07:53)  T(F): 98.4 (12 Feb 2019 07:53), Max: 98.4 (12 Feb 2019 07:53)  HR: 76 (12 Feb 2019 07:53) (76 - 81)  BP: 152/64 (12 Feb 2019 07:53) (131/71 - 152/64)  RR: 20 (12 Feb 2019 07:53) (18 - 20)  SpO2: 98% (12 Feb 2019 07:53) (97% - 98%)    PHYSICAL EXAM:    GENERAL: NAD  NEck - non tedner, swelling resolved  HEENT- improving erythema,   NERVOUS SYSTEM:  Alert & Oriented X3, Good concentration; Motor Strength 5/5 B/L upper and lower extremities; DTRs 2+ intact and symmetric  CHEST/LUNG: Clear to percussion bilaterally; No rales, rhonchi, wheezing, or rubs  HEART: Regular rate and rhythm; No murmurs, rubs, or gallops  ABDOMEN: Soft, Nontender, Nondistended; Bowel sounds present  EXTREMITIES:  no edema    time spent 32 minutes

## 2019-02-12 NOTE — DISCHARGE NOTE ADULT - MEDICATION SUMMARY - MEDICATIONS TO CHANGE
I will SWITCH the dose or number of times a day I take the medications listed below when I get home from the hospital:    Cleocin HCl 300 mg oral capsule  -- 1 cap(s) by mouth every 6 hours  -- Finish all this medication unless otherwise directed by prescriber.  Medication should be taken with plenty of water. I will SWITCH the dose or number of times a day I take the medications listed below when I get home from the hospital:  None

## 2019-02-12 NOTE — DISCHARGE NOTE ADULT - CARE PLAN
Principal Discharge DX:	Peritonsillar abscess  Goal:	sepsis present on admission resolved  Assessment and plan of treatment:	c/w Clindamycin 600mg three times a day for 5 more days plus Florastor  c/w Medrol dose pack  f/u ENT  Secondary Diagnosis:	GERD (gastroesophageal reflux disease)  Assessment and plan of treatment:	c/w PPI  Secondary Diagnosis:	Asthma  Secondary Diagnosis:	Elevated blood pressure reading  Assessment and plan of treatment:	low salt diet, exercise  Monitor BP at home

## 2019-02-12 NOTE — DISCHARGE NOTE ADULT - PATIENT PORTAL LINK FT
You can access the [email or web address], offered by [facility], by registering with the following website: [email or web address]

## 2019-02-12 NOTE — DISCHARGE NOTE ADULT - MEDICATION SUMMARY - MEDICATIONS TO TAKE
I will START or STAY ON the medications listed below when I get home from the hospital:    Medrol Dosepak 4 mg oral tablet  -- take as directed  -- It is very important that you take or use this exactly as directed.  Do not skip doses or discontinue unless directed by your doctor.  Obtain medical advice before taking any non-prescription drugs as some may affect the action of this medication.  Take with food or milk.    -- Indication: For tonsillar abscess    clindamycin 300 mg oral capsule  -- 1 cap(s) by mouth 2 times a day - take 2 tablets three times a day  -- Finish all this medication unless otherwise directed by prescriber.  Medication should be taken with plenty of water.    -- Indication: For tonsillar abscess    saccharomyces boulardii lyo 250 mg oral capsule  -- 1 cap(s) by mouth 2 times a day  -- Indication: For Prophylaxis    omeprazole 40 mg oral delayed release capsule  -- 1 cap(s) by mouth once a day  -- Indication: For GERD (gastroesophageal reflux disease) I will START or STAY ON the medications listed below when I get home from the hospital:    Medrol Dosepak 4 mg oral tablet  -- take as directed  -- It is very important that you take or use this exactly as directed.  Do not skip doses or discontinue unless directed by your doctor.  Obtain medical advice before taking any non-prescription drugs as some may affect the action of this medication.  Take with food or milk.    -- Indication: For Peritonsillar abscess    clindamycin 300 mg oral capsule  -- 1 cap(s) by mouth 2 times a day - take 2 tablets three times a day  -- Finish all this medication unless otherwise directed by prescriber.  Medication should be taken with plenty of water.    -- Indication: For Peritonsillar abscess    saccharomyces boulardii lyo 250 mg oral capsule  -- 1 cap(s) by mouth 2 times a day  -- Indication: For Antibiotics prophylaxis    omeprazole 40 mg oral delayed release capsule  -- 1 cap(s) by mouth once a day  -- Indication: For GERD (gastroesophageal reflux disease)

## 2019-02-12 NOTE — DISCHARGE NOTE ADULT - PLAN OF CARE
c/w PPI low salt diet, exercise  Monitor BP at home sepsis present on admission resolved c/w Clindamycin 600mg three times a day for 5 more days plus Florastor  c/w Medrol dose pack  f/u ENT

## 2019-02-12 NOTE — DISCHARGE NOTE ADULT - CARE PROVIDER_API CALL
Rob Ruby)  Otolaryngology  [address] [place] [address]  Phone: [phone]  Fax: [phone]  Follow Up Time:

## 2019-02-14 LAB
CULTURE RESULTS: SIGNIFICANT CHANGE UP
CULTURE RESULTS: SIGNIFICANT CHANGE UP
SPECIMEN SOURCE: SIGNIFICANT CHANGE UP
SPECIMEN SOURCE: SIGNIFICANT CHANGE UP

## 2021-05-26 NOTE — PATIENT PROFILE ADULT - NSTRANSFERBELONGINGSDISPO_GEN_A_NUR
with patient O-L Flap Text: The defect edges were debeveled with a #15 scalpel blade.  Given the location of the defect, shape of the defect and the proximity to free margins an O-L flap was deemed most appropriate.  Using a sterile surgical marker, an appropriate advancement flap was drawn incorporating the defect and placing the expected incisions within the relaxed skin tension lines where possible.    The area thus outlined was incised deep to adipose tissue with a #15 scalpel blade.  The skin margins were undermined to an appropriate distance in all directions utilizing iris scissors.

## 2021-10-29 NOTE — H&P ADULT - ASSESSMENT
4 This is 37yo M with PMh of GERD presenting with throat swelling and pain since Monday, he developed sore throat, went to Urgent Care and was told he has Strep, was prescribed cleocin which he has been taking.  Sx did not improve and went back to Urgent Care today, found to have an abscess and referred to ED.  Temp 102 in ED.  Has had trouble swallowing over last few days. Report severe pain during swallowing, unable to tolerate PO. Pt was seen by ENT, s/p 1 cc pus removed    A/P    >Sepsis due to L tonsillar abscess  admit to medicine   appreciate ENT - s/p 1 cc pus removed  start Cleocin, IVF  trend WBC and fever    >GERd - PPI    >Asthma -stable   nebs prn    >DVt PPX - SCD, encourage ambulation This is 39yo M with PMh of GERD presenting with throat swelling and pain since Monday, he developed sore throat, went to Urgent Care and was told he has Strep, was prescribed cleocin which he has been taking.  Sx did not improve and went back to Urgent Care today, found to have an abscess and referred to ED.  Temp 102 in ED.  Has had trouble swallowing over last few days. Report severe pain during swallowing, unable to tolerate PO. Pt was seen by ENT, s/p 1 cc pus removed    A/P    >Sepsis due to L tonsillar abscess  admit to medicine   appreciate ENT - s/p 1 cc pus removed  start Cleocin, IVF  c/w decadron taper per ENT   trend WBC and fever  f/u wound and blood c/s    >GERd - PPI    >Asthma -stable   nebs prn    >DVt PPX - SCD, encourage ambulation
